# Patient Record
Sex: FEMALE | Race: ASIAN | NOT HISPANIC OR LATINO
[De-identification: names, ages, dates, MRNs, and addresses within clinical notes are randomized per-mention and may not be internally consistent; named-entity substitution may affect disease eponyms.]

---

## 2024-10-02 ENCOUNTER — NON-APPOINTMENT (OUTPATIENT)
Age: 34
End: 2024-10-02

## 2024-10-03 ENCOUNTER — NON-APPOINTMENT (OUTPATIENT)
Age: 34
End: 2024-10-03

## 2024-10-03 ENCOUNTER — APPOINTMENT (OUTPATIENT)
Dept: OTOLARYNGOLOGY | Facility: CLINIC | Age: 34
End: 2024-10-03
Payer: COMMERCIAL

## 2024-10-03 VITALS
DIASTOLIC BLOOD PRESSURE: 83 MMHG | HEART RATE: 113 BPM | OXYGEN SATURATION: 98 % | SYSTOLIC BLOOD PRESSURE: 122 MMHG | TEMPERATURE: 97.8 F

## 2024-10-03 DIAGNOSIS — Z82.49 FAMILY HISTORY OF ISCHEMIC HEART DISEASE AND OTHER DISEASES OF THE CIRCULATORY SYSTEM: ICD-10-CM

## 2024-10-03 DIAGNOSIS — Z31.41 ENCOUNTER FOR FERTILITY TESTING: ICD-10-CM

## 2024-10-03 DIAGNOSIS — Z83.438 FAMILY HISTORY OF OTHER DISORDER OF LIPOPROTEIN METABOLISM AND OTHER LIPIDEMIA: ICD-10-CM

## 2024-10-03 DIAGNOSIS — Z78.9 OTHER SPECIFIED HEALTH STATUS: ICD-10-CM

## 2024-10-03 DIAGNOSIS — R60.0 LOCALIZED EDEMA: ICD-10-CM

## 2024-10-03 DIAGNOSIS — J34.2 DEVIATED NASAL SEPTUM: ICD-10-CM

## 2024-10-03 DIAGNOSIS — K11.20 SIALOADENITIS, UNSPECIFIED: ICD-10-CM

## 2024-10-03 PROBLEM — Z00.00 ENCOUNTER FOR PREVENTIVE HEALTH EXAMINATION: Status: ACTIVE | Noted: 2024-10-03

## 2024-10-03 PROCEDURE — 99203 OFFICE O/P NEW LOW 30 MIN: CPT

## 2024-10-03 RX ORDER — UBIDECARENONE 50 MG
CAPSULE ORAL
Refills: 0 | Status: ACTIVE | COMMUNITY

## 2024-10-03 RX ORDER — CHOLECALCIFEROL (VITAMIN D3) 25 MCG
TABLET ORAL
Refills: 0 | Status: ACTIVE | COMMUNITY

## 2024-10-03 RX ORDER — CRANBERRY FRUIT EXTRACT 200 MG
CAPSULE ORAL
Refills: 0 | Status: ACTIVE | COMMUNITY

## 2024-10-03 RX ORDER — BACILLUS COAGULANS/INULIN 1B-250 MG
CAPSULE ORAL
Refills: 0 | Status: ACTIVE | COMMUNITY

## 2024-10-03 NOTE — PHYSICAL EXAM
[] : septum deviated to the right [Midline] : trachea located in midline position [Normal] : tympanic membranes are normal in both ears [de-identified] : no evidence of stones on floor of mouth palpation

## 2024-10-03 NOTE — PHYSICAL EXAM
[] : septum deviated to the right [Midline] : trachea located in midline position [Normal] : tympanic membranes are normal in both ears [de-identified] : no evidence of stones on floor of mouth palpation

## 2024-10-03 NOTE — REASON FOR VISIT
[Initial Consultation] : an initial consultation for [FreeTextEntry2] : left submandibular gland swelling

## 2024-10-03 NOTE — HISTORY OF PRESENT ILLNESS
[de-identified] : 10/3/24: 33 y/o F presents with two instances of left submandibular gland swelling, first in mid-August, second in mid-September. She notices this only after meals, and her gland decreases in size apparently 1 hour after meals. She reports first instance started after she heard "snap" of left jaw. During first occasion in mid-August she felt her gland was red/ inflamed/ and swollen. She had granule of sand which she describes as a crystal which came out of her mouth. In terms of treatments she has been staying well hydrated and having sour candies. Nonsmoker. She had similar symptoms around September 15th which lasted for 3 days and she expectorated another "grain" of sand. She went to urgent care for second instance and tested negative for strep.

## 2024-10-03 NOTE — HISTORY OF PRESENT ILLNESS
[de-identified] : 10/3/24: 33 y/o F presents with two instances of left submandibular gland swelling, first in mid-August, second in mid-September. She notices this only after meals, and her gland decreases in size apparently 1 hour after meals. She reports first instance started after she heard "snap" of left jaw. During first occasion in mid-August she felt her gland was red/ inflamed/ and swollen. She had granule of sand which she describes as a crystal which came out of her mouth. In terms of treatments she has been staying well hydrated and having sour candies. Nonsmoker. She had similar symptoms around September 15th which lasted for 3 days and she expectorated another "grain" of sand. She went to urgent care for second instance and tested negative for strep.

## 2024-10-03 NOTE — ASSESSMENT
[FreeTextEntry1] : - 10/3/24: 35 y/o F presents with two instances of left submandibular gland swelling, first in mid August, second in mid September. On physical exam she was incidentally found to have right septal deviation, otherwise normal exam. She does not appear to have an infection or stone. I suspect she is suffering from sialadenitis. We reviewed my sialadenitis protocol including hydration, hot packs, gentle massages, sour candies, Tylenol/ Motrin PRN, given handout. Follow up with me as needed, should she develop any new/ worsening symptoms.  At that point would plan on imaging including ultrasound of the neck, or consultation with Dr. Delgado, salivary gland specialist.  -Sialadenitis protocol -Follow up with me PRN